# Patient Record
Sex: FEMALE | Race: BLACK OR AFRICAN AMERICAN | NOT HISPANIC OR LATINO | URBAN - METROPOLITAN AREA
[De-identification: names, ages, dates, MRNs, and addresses within clinical notes are randomized per-mention and may not be internally consistent; named-entity substitution may affect disease eponyms.]

---
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
## 2018-05-08 ENCOUNTER — OFFICE VISIT (OUTPATIENT)
Dept: FAMILY MEDICINE | Facility: CLINIC | Age: 18
End: 2018-05-08
Payer: COMMERCIAL

## 2018-05-08 VITALS
RESPIRATION RATE: 16 BRPM | TEMPERATURE: 97.9 F | HEIGHT: 63 IN | DIASTOLIC BLOOD PRESSURE: 75 MMHG | HEART RATE: 82 BPM | SYSTOLIC BLOOD PRESSURE: 118 MMHG | WEIGHT: 96.2 LBS | BODY MASS INDEX: 17.05 KG/M2 | OXYGEN SATURATION: 95 %

## 2018-05-08 DIAGNOSIS — L70.0 ACNE VULGARIS: Primary | ICD-10-CM

## 2018-05-08 DIAGNOSIS — H00.012 HORDEOLUM EXTERNUM OF RIGHT LOWER EYELID: ICD-10-CM

## 2018-05-08 RX ORDER — PIMECROLIMUS 10 MG/G
CREAM TOPICAL 2 TIMES DAILY
Qty: 60 G | Refills: 1 | Status: SHIPPED | OUTPATIENT
Start: 2018-05-08 | End: 2020-09-28

## 2018-05-08 NOTE — MR AVS SNAPSHOT
After Visit Summary   5/8/2018    Venice Carrero    MRN: [id number]           Patient Information     Date Of Birth          2000        Visit Information        Provider Department      5/8/2018 8:40 AM Chiara Pena MD [facility]        Today's Diagnoses     Acne vulgaris    -  1    Hordeolum externum of right lower eyelid          Care Instructions    Here is the plan from today's visit    Acne vulgaris  - refill pimecrolimus (ELIDEL) 1 % cream; Apply topically 2 times daily  Dispense: 60 g; Refill: 1    Hordeolum externum of right lower eyelid    Sty (or Stye)  A sty is an infection of the oil gland of the eyelid. It may develop into a small pocket of pus (an abscess). This can cause pain, redness, and swelling. In early stages, a sty is treated with antibiotic cream, eye drops, or a small towel soaked in warm water (a warm compress). More severe cases may need to be opened and drained by a healthcare provider.  Home care    Eye drops or ointment are usually prescribed to treat the infection. Use these as directed.     Artificial tears may also be used to lubricate the eye and make it more comfortable. You can buy these over the counter without a prescription. Talk with your healthcare provider before using any over-the-counter treatment for a sty.    Apply a warm, damp towel to the affected eye for at least 5 minutes, 3 to 4 times a day for a week. Warm compresses open the pores and speed the healing. But if the compresses are too hot, they may burn your eyelid.    Sometimes the sty will drain with this treatment alone. If this happens, keep using the antibiotic until all the redness and swelling are gone.    Wash your hands before and after touching the infected eyelid to avoid spreading the infection.    Don t squeeze or try to break open the sty.  Follow-up care  Follow up with your healthcare provider, or as advised.   When to seek medical advice  Call your healthcare  provider right away if any of these occur:    Increase in swelling or redness around the eyelid after 48 to 72 hours    Increase in eye pain or the eyelid blisters    Increase in warmth--the eyelid feels hot    Drainage of blood or thick pus from the sty    Blister on the eyelid    Inability to open the eyelid due to swelling    Fever of 100.4 F (38 C) or above, or as directed by your provider    Vision changes    Headache or stiff neck    The sty comes back  Date Last Reviewed: 8/1/2017 2000-2017 [employer]. [address] [place] [address]. All rights reserved. This information is not intended as a substitute for professional medical care. Always follow your healthcare professional's instructions.      Please call or return to clinic if your symptoms don't go away.    Follow up plan  As needed    Thank you for coming to [facility] today.  Lab Testing:  **If you had lab testing today and your results are reassuring or normal they will be mailed to you or sent through [employer] within 7 days.   **If the lab tests need quick action we will call you with the results.  The phone number we will call with results is # [phone] (home) . If this is not the best number please call our clinic and change the number.  Medication Refills:  If you need any refills please call your pharmacy and they will contact us.   If you need to  your refill at a new pharmacy, please contact the new pharmacy directly. The new pharmacy will help you get your medications transferred faster.   Scheduling:  If you have any concerns about today's visit or wish to schedule another appointment please call our office during normal business hours [phone] (8-5:00 M-F)  If a referral was made to a [facility] and you don't get a call from central scheduling please call [phone].  If a Mammogram was ordered for you at [facility] call [phone] to schedule or change your  "appointment.  If you had an XRay/CT/Ultrasound/MRI ordered the number is [phone] to schedule or change your radiology appointment.   Medical Concerns:  If you have urgent medical concerns please call [phone] at any time of the day.    Chiara Pena MD          Follow-ups after your visit        Who to contact     Please call your clinic at [phone] to:    Ask questions about your health    Make or cancel appointments    Discuss your medicines    Learn about your test results    Speak to your doctor            Additional Information About Your Visit        [employer] Information     [employer] is an electronic gateway that provides easy, online access to your medical records. With [employer], you can request a clinic appointment, read your test results, renew a prescription or communicate with your care team.     To sign up for [employer] visit the website at [email or web address]   You will be asked to enter the access code listed below, as well as some personal information. Please follow the directions to create your username and password.     Your access code is: [email or web address]  Expires: 2018  8:58 AM     Your access code will  in 90 days. If you need help or a new code, please contact your [facility] or call [phone] for assistance.        Care EveryWhere ID     This is your Care EveryWhere ID. This could be used by other organizations to access your [place] medical records  [id number]        Your Vitals Were     Pulse Temperature Respirations Height Pulse Oximetry Breastfeeding?    82 97.9  F (36.6  C) (Oral) 16 5' 2.5\" (158.8 cm) 95% No    BMI (Body Mass Index)                   17.31 kg/m2            Blood Pressure from Last 3 Encounters:   18 118/75   17 111/71   10/11/17 116/73    Weight from Last 3 Encounters:   18 96 lb 3.2 oz (43.6 kg) (2 %)*   17 106 lb (48.1 kg) (10 %)*   10/11/17 101 lb 6.4 oz (46 kg) (5 %)*     * Growth " percentiles are based on [facility] 2-20 Years data.              Today, you had the following     No orders found for display         Today's Medication Changes          These changes are accurate as of 5/8/18  8:58 AM.  If you have any questions, ask your nurse or doctor.               Start taking these medicines.        Dose/Directions    pimecrolimus 1 % cream   Commonly known as:  ELIDEL   Used for:  Acne vulgaris   Started by:  Chiara Pena MD        Apply topically 2 times daily   Quantity:  60 g   Refills:  1            Where to get your medicines      Call your pharmacy to confirm that your medication is ready for pickup. It may take up to 24 hours for them to receive the prescription. If the prescription is not ready within 3 business days, please contact your clinic or your provider.     We will let you know when these medications are ready. If you don't hear back within 3 business days, please contact us.     pimecrolimus 1 % cream                Primary Care Provider Office Phone # Fax #    Chiara Pena [phone]       [address] [address]        Equal Access to Services     [facility]: Hadii charisma ku hadasho Soomaali, waaxda luqadaha, qaybta kaalmada adeegyada, waxay wes young . So [facility] [phone].    ATENCIÓN: Si habla español, tiene a goodwin disposición servicios gratuitos de asistencia lingüística. Ll[facility] [phone].    We comply with applicable federal civil rights laws and [place] laws. We do not discriminate on the basis of race, color, national origin, age, disability, sex, sexual orientation, or gender identity.            Thank you!     Thank you for choosing [facility]  for your care. Our goal is always to provide you with excellent care. Hearing back from our patients is one way we can continue to improve our services. Please take a few minutes to complete the written survey that you may receive  in the mail after your visit with us. Thank you!             Your Updated Medication List - Protect others around you: Learn how to safely use, store and throw away your medicines at www.disposemymeds.org.          This list is accurate as of 5/8/18  8:58 AM.  Always use your most recent med list.                   Brand Name Dispense Instructions for use Diagnosis    cetirizine 10 MG tablet    zyrTEC    30 tablet    Take 1 tablet (10 mg) by mouth every evening        ibuprofen 600 MG tablet    ADVIL/MOTRIN    15 tablet    Take 1 tablet (600 mg) by mouth every 8 hours as needed for moderate pain        pimecrolimus 1 % cream    ELIDEL    60 g    Apply topically 2 times daily    Acne vulgaris

## 2018-05-08 NOTE — PROGRESS NOTES
HPI:       Venice Carrero is an otherwise healthy 17 year old who presents for a bump on her R eye. Pt notes that this painless bump located on her outer R lower lid has been present for 2 years - it has never resolved completely, although she notes it is slightly improved today. Has not tried anything for this in the past. Has also had a similar bump on her L eye a year ago that resolved spontaneously. Denies any pain, itchiness, conjunctival injection, eye discharge, changes in vision, fever or recent illness. Does not wear contacts and denies any recent eye trauma/foreign bodies.    She also expresses some concern about feeling underweight. Says she gets full easily, and therefore does not eat much. Denies any abdominal pain, fatigue/lethargy, n/v/d.     Eye(s) Problem    Concerns: Bump on R lower lid     When did it start? 2 years    Description:   Which eye(s): right  Pain:no  Redness: no    Accompanying Signs & Symptoms:  Discharge/mattering: no  Swelling: no  Visual changes: no  Fever:no  Nasal Congestion: no  Bothered by bright lights: no    History:   Trauma:no}  Foreign body exposure:no    Precipitating factors:   Wearing contacts: no    Alleviating factors:  Improved by: Nothing    Therapies Tried and outcome: Nothing    Problem, Medication and Allergy Lists were reviewed and are current.     Patient Active Problem List    Diagnosis Date Noted     Rash and nonspecific skin eruption 11/19/2017     Priority: Medium     Seasonal allergic rhinitis, unspecified allergic rhinitis trigger 05/06/2017     Priority: Medium     Finger injury, left, initial encounter 05/06/2017     Priority: Medium     Patient is History reviewed. No pertinent past medical history.         Review of Systems:   Review of Systems   A comprehensive 10 point review of systems was completed - pertinent positives and negatives listed above.        Physical Exam:   Patient Vitals for the past 24 hrs:   BP Temp Temp src Pulse Resp SpO2  "Height Weight   05/08/18 0841 118/75 97.9  F (36.6  C) Oral 82 16 95 % 5' 2.5\" (158.8 cm) 96 lb 3.2 oz (43.6 kg)     Body mass index is 17.31 kg/(m^2).  Vitals were reviewed and were normal     Physical Exam   Constitutional: She appears well-developed. No distress.   HENT:   Head: Normocephalic and atraumatic.   Nose: Nose normal.   Mouth/Throat: Oropharynx is clear and moist. No oropharyngeal exudate.   Eyes: Conjunctivae and EOM are normal. Pupils are equal, round, and reactive to light. Right eye exhibits no discharge. Left eye exhibits no discharge. No scleral icterus.   Non-tender hordeolum of outer R lower lid   Neck: Neck supple. No JVD present. No thyromegaly present.   Cardiovascular: Normal rate, regular rhythm and normal heart sounds.  Exam reveals no gallop and no friction rub.    No murmur heard.  Pulmonary/Chest: Effort normal and breath sounds normal. No respiratory distress. She has no wheezes.   Abdominal: Soft. Bowel sounds are normal. She exhibits no distension. There is no tenderness.   Musculoskeletal: She exhibits no edema, tenderness or deformity.   Lymphadenopathy:     She has no cervical adenopathy.   Skin: Skin is warm and dry. No rash noted. No erythema.   Vitals reviewed.      Results:     Results from last visit:  Office Visit on 08/25/2016   Component Date Value Ref Range Status     Rapid Strep A Screen 08/25/2016 NEGATIVE  Negative Final     Specimen Description 08/25/2016 Throat   Final     Culture Micro 08/25/2016 No Beta Streptococcus isolated   Final     Micro Report Status 08/25/2016 FINAL 08/27/2016   Final     Assessment and Plan     18 yo woman  With    #Hordeolum externum of right lower eyelid  - warm compress and allow stye to self-resolve  - discussed ophthalmology referral if worsens or pt develops signs of infection    #Acne vulgaris  - refill pimecrolimus (ELIDEL) 1 % cream; Apply topically 2 times daily  Dispense: 60 g; Refill: 1    Invited to RTC to address her low " weight concerns.    There are no discontinued medications.  Options for treatment and follow-up care were reviewed with the patient. Venice Carrero  engaged in the decision making process and verbalized understanding of the options discussed and agreed with the final plan.    Chiara Pena MD

## 2018-05-08 NOTE — PATIENT INSTRUCTIONS
Here is the plan from today's visit    Acne vulgaris  - refill pimecrolimus (ELIDEL) 1 % cream; Apply topically 2 times daily  Dispense: 60 g; Refill: 1    Hordeolum externum of right lower eyelid    Sty (or Stye)  A sty is an infection of the oil gland of the eyelid. It may develop into a small pocket of pus (an abscess). This can cause pain, redness, and swelling. In early stages, a sty is treated with antibiotic cream, eye drops, or a small towel soaked in warm water (a warm compress). More severe cases may need to be opened and drained by a healthcare provider.  Home care    Eye drops or ointment are usually prescribed to treat the infection. Use these as directed.     Artificial tears may also be used to lubricate the eye and make it more comfortable. You can buy these over the counter without a prescription. Talk with your healthcare provider before using any over-the-counter treatment for a sty.    Apply a warm, damp towel to the affected eye for at least 5 minutes, 3 to 4 times a day for a week. Warm compresses open the pores and speed the healing. But if the compresses are too hot, they may burn your eyelid.    Sometimes the sty will drain with this treatment alone. If this happens, keep using the antibiotic until all the redness and swelling are gone.    Wash your hands before and after touching the infected eyelid to avoid spreading the infection.    Don t squeeze or try to break open the sty.  Follow-up care  Follow up with your healthcare provider, or as advised.   When to seek medical advice  Call your healthcare provider right away if any of these occur:    Increase in swelling or redness around the eyelid after 48 to 72 hours    Increase in eye pain or the eyelid blisters    Increase in warmth--the eyelid feels hot    Drainage of blood or thick pus from the sty    Blister on the eyelid    Inability to open the eyelid due to swelling    Fever of 100.4 F (38 C) or above, or as directed by your  provider    Vision changes    Headache or stiff neck    The sty comes back  Date Last Reviewed: 8/1/2017 2000-2017 [employer]. [address] [place] [address], [place] [address]. All rights reserved. This information is not intended as a substitute for professional medical care. Always follow your healthcare professional's instructions.      Please call or return to clinic if your symptoms don't go away.    Follow up plan  As needed    Thank you for coming to [facility] today.  Lab Testing:  **If you had lab testing today and your results are reassuring or normal they will be mailed to you or sent through [employer] within 7 days.   **If the lab tests need quick action we will call you with the results.  The phone number we will call with results is # [phone] (home) . If this is not the best number please call our clinic and change the number.  Medication Refills:  If you need any refills please call your pharmacy and they will contact us.   If you need to  your refill at a new pharmacy, please contact the new pharmacy directly. The new pharmacy will help you get your medications transferred faster.   Scheduling:  If you have any concerns about today's visit or wish to schedule another appointment please call our office during normal business hours [phone] (8-5:00 M-F)  If a referral was made to a [facility] and you don't get a call from central scheduling please call [phone].  If a Mammogram was ordered for you at [facility] call [phone] to schedule or change your appointment.  If you had an XRay/CT/Ultrasound/MRI ordered the number is [phone] to schedule or change your radiology appointment.   Medical Concerns:  If you have urgent medical concerns please call [phone] at any time of the day.    Chiara Pena MD

## 2018-05-08 NOTE — PROGRESS NOTES
I was present with the medical student who participated in the service and in the documentation of this note. I have verified the history and personally performed the physical exam and medical decision making, and have verified the content of the note, which accurately reflects my assessment of the patient and the plan of care.   Chiara Pena MD

## 2020-09-28 ENCOUNTER — OFFICE VISIT (OUTPATIENT)
Dept: FAMILY MEDICINE | Facility: CLINIC | Age: 20
End: 2020-09-28
Payer: COMMERCIAL

## 2020-09-28 VITALS
HEART RATE: 95 BPM | WEIGHT: 116.8 LBS | RESPIRATION RATE: 16 BRPM | HEIGHT: 63 IN | OXYGEN SATURATION: 98 % | BODY MASS INDEX: 20.7 KG/M2 | SYSTOLIC BLOOD PRESSURE: 116 MMHG | DIASTOLIC BLOOD PRESSURE: 79 MMHG | TEMPERATURE: 98.5 F

## 2020-09-28 DIAGNOSIS — L81.0 POST-INFLAMMATORY HYPERPIGMENTATION: Primary | ICD-10-CM

## 2020-09-28 ASSESSMENT — MIFFLIN-ST. JEOR: SCORE: 1263.93

## 2020-09-28 NOTE — PATIENT INSTRUCTIONS
Here is the plan from today's visit    1. Post-inflammatory hyperpigmentation  These skin changes are related to inflammation from using a portable heater, daily. These skin changes may go away in months or may be permanent. If concerned about the cosmetic appearance, we can place a dermatology referral in the future for laser therapy.    Stop using the heater. Wear long pants, socks, or use a blanket for heat instead.      Please call or return to clinic if your symptoms don't go away.    Follow up as needed.    Thank you for coming to [facility] today.  Lab Testing:  **If you had lab testing today and your results are reassuring or normal they will be mailed to you or sent through [employer] within 7 days.   **If the lab tests need quick action we will call you with the results.  The phone number we will call with results is # [phone] (home) . If this is not the best number please call our clinic and change the number.  Medication Refills:  If you need any refills please call your pharmacy and they will contact us.   If you need to  your refill at a new pharmacy, please contact the new pharmacy directly. The new pharmacy will help you get your medications transferred faster.   Scheduling:  If you have any concerns about today's visit or wish to schedule another appointment please call our office during normal business hours [phone] (8-5:00 M-F)  If a referral was made to a [facility] and you don't get a call from central scheduling please call [phone].  If a Mammogram was ordered for you at [facility] call [phone] to schedule or change your appointment.  If you had an XRay/CT/Ultrasound/MRI ordered the number is [phone] to schedule or change your radiology appointment.   Medical Concerns:  If you have urgent medical concerns please call [phone] at any time of the day.    Steven Harper, DO

## 2020-09-28 NOTE — PROGRESS NOTES
Venice is a 19 year old  who presents for   Patient presents with:  Derm Problem: back of both calves she has dark lines going up. First notices 7 months ago. No pain or itching.    Assessment and Plan        1. Post-inflammatory hyperpigmentation  Pt has a mottled/reticular, flat, diffuse hyperpigmented lesion over bilateral calves, which started March 2020 and has increased in distribution. Non-tender without swelling. Pt is otherwise well. No sun exposure or medication use. Healthy female-no history of liver disease. No raised lesions to suggest cellulitis, dermatitis, or fungal infection.     Suspect these skin changes are an inflammatory response to daily/chronic use of a portable heater. Advised to stop using the heater and use indirect forms of heat instead (ex. socks, pants, blanket). These skin changes may resolve in months, or may be permanent. If pt desires, can place a dermatology referral for laser consultation in the future.       No follow-ups on file.    Medications Discontinued During This Encounter   Medication Reason     pimecrolimus (ELIDEL) 1 % cream Medication Reconciliation Clean Up     cetirizine (ZYRTEC) 10 MG tablet Medication Reconciliation Clean Up     ibuprofen (ADVIL,MOTRIN) 600 MG tablet Medication Reconciliation Clean Up         Steven Harper, DO         HPI       Venice is a 19 year old  who presents for   Patient presents with:  Derm Problem: back of both calves she has dark lines going up. First notices 7 months ago. No pain or itching.      Visit [place]  1. Marks on skin  -no family members with similar lesions  -not bothersome, just curious what it is    -when:    -march 2020  -description:    -hyperpigmented lesions, flat   -non-tender, non-pruritic   -no open wounds   -no swelling  -location:   -bilateral calves   -no other body areas affected  -exposures:   -denies sun exposure   -no medications or creams   -has been using a heater daily (always cold)  -therapies  "tried:   -none    Problem, Medication and Allergy Lists were reviewed and updated if needed..  Patient is an established patient of this clinic..  Health Maintenance Due   Topic Date Due     HIV SCREENING  11/15/2013     PREVENTIVE CARE VISIT  06/12/2015     PAP  11/15/2019     PHQ-2  01/01/2020     INFLUENZA VACCINE (1) 09/01/2020          Review of Systems:   12 point review of symptoms was otherwise negative except as noted in HPI         Physical Exam:     Vitals:    09/28/20 1440   BP: 116/79   Pulse: 95   Resp: 16   Temp: 98.5  F (36.9  C)   TempSrc: Oral   SpO2: 98%   Weight: 53 kg (116 lb 12.8 oz)   Height: 1.6 m (5' 3\")     Body mass index is 20.69 kg/m .  Vitals were reviewed and were normal     Physical Exam  Constitutional:       General: She is not in acute distress.     Appearance: Normal appearance. She is normal weight. She is not ill-appearing or diaphoretic.   HENT:      Head: Normocephalic and atraumatic.      Mouth/Throat:      Mouth: Mucous membranes are moist.   Eyes:      General: No scleral icterus.     Extraocular Movements: Extraocular movements intact.      Conjunctiva/sclera: Conjunctivae normal.      Pupils: Pupils are equal, round, and reactive to light.   Pulmonary:      Effort: Pulmonary effort is normal. No respiratory distress.   Abdominal:      General: Abdomen is flat. There is no distension.   Musculoskeletal: Normal range of motion.      Right lower leg: No edema.      Left lower leg: No edema.      Comments: No calf tenderness.   Skin:     Capillary Refill: Capillary refill takes less than 2 seconds.      Coloration: Skin is not jaundiced.      Comments: See image below. Remainder of exposed skin normal w/o any skin changes.   Neurological:      General: No focal deficit present.      Mental Status: She is alert and oriented to person, place, and time. Mental status is at baseline.      Cranial Nerves: No cranial nerve deficit.      Motor: No weakness.      Coordination: " Coordination normal.      Gait: Gait normal.   Psychiatric:         Mood and Affect: Mood normal.         Thought Content: Thought content normal.           Image: L lower leg      Results:   No testing ordered today    Options for treatment and follow-up care were reviewed with the patient. Venice Carrero  engaged in the decision making process and verbalized understanding of the options discussed and agreed with the final plan.    Steven Harper, DO

## 2020-09-28 NOTE — PROGRESS NOTES
Preceptor Attestation:  Patient seen and evaluated in person. I discussed the patient with the resident. I have verified the content of the note, which accurately reflects my assessment of the patient and the plan of care.   Supervising Physician:  Myles Zuniga DO.

## 2021-05-24 ENCOUNTER — OFFICE VISIT (OUTPATIENT)
Dept: FAMILY MEDICINE | Facility: CLINIC | Age: 21
End: 2021-05-24
Payer: COMMERCIAL

## 2021-05-24 VITALS
HEIGHT: 63 IN | HEART RATE: 90 BPM | RESPIRATION RATE: 14 BRPM | TEMPERATURE: 97.8 F | WEIGHT: 133 LBS | SYSTOLIC BLOOD PRESSURE: 107 MMHG | BODY MASS INDEX: 23.57 KG/M2 | DIASTOLIC BLOOD PRESSURE: 74 MMHG | OXYGEN SATURATION: 98 %

## 2021-05-24 DIAGNOSIS — H61.23 BILATERAL IMPACTED CERUMEN: Primary | ICD-10-CM

## 2021-05-24 PROCEDURE — 69210 REMOVE IMPACTED EAR WAX UNI: CPT | Mod: GC | Performed by: STUDENT IN AN ORGANIZED HEALTH CARE EDUCATION/TRAINING PROGRAM

## 2021-05-24 ASSESSMENT — MIFFLIN-ST. JEOR: SCORE: 1332.28

## 2021-05-24 NOTE — PROGRESS NOTES
"    Assessment & Plan     Bilateral impacted cerumen  Pre-procedure Diagnosis: Cerumen impaction, bilateral  Post-procedure Diagnosis: same    Technique:   Water irrigation with peroxide, and curette. Both ears.     Patient tolerated procedure well. Significant efflux cerumen seen. TMs visualized s/p wash. Hearing normalized.    For maintenance, recommended wiping outer ear with washcloth. Already avoiding Q tips.    Resident: Puja Leyva MD  Faculty: Puja Leyva MD present for and supervised this entire procedure.         No follow-ups on file.    Puja Leyva MD  [facility] BRANDON Millard is a 20 year old who presents for the following health issues     HPI     Patient presents with:  Ear Problem: patient has had trouble hearing in left ear that started wednesday. pt does not have itching or pain in the ear but only feels like she cant hear. pt tried ear wax removal kit and it did not help, she still feels like she cant hear from her ear.      Since Wednesday - left ear very muffled. Tried OTC removal kit - drops and bulb, on Sat, Sun. Didn't improve.  No fever  No drainage  Doesn't use Q tips    Fever Sun - subjective. Just got 2nd covid shot.    Review of Systems         Objective    /74   Pulse 90   Temp 97.8  F (36.6  C) (Oral)   Resp 14   Ht 1.6 m (5' 2.99\")   Wt 60.3 kg (133 lb)   SpO2 98%   BMI 23.57 kg/m    Body mass index is 23.57 kg/m .  Physical Exam  Constitutional:       General: She is not in acute distress.     Appearance: She is well-developed. She is not diaphoretic.   HENT:      Head: Normocephalic and atraumatic.      Right Ear: There is impacted cerumen.      Left Ear: There is impacted cerumen.   Eyes:      Conjunctiva/sclera: Conjunctivae normal.   Neck:      Musculoskeletal: Normal range of motion.   Cardiovascular:      Rate and Rhythm: Normal rate.   Pulmonary:      Effort: Pulmonary effort is normal. No respiratory distress. "   Musculoskeletal: Normal range of motion.   Neurological:      General: No focal deficit present.      Mental Status: She is alert.            ----- Service Performed and Documented by Resident or Fellow ------

## 2021-05-24 NOTE — PROGRESS NOTES
Preceptor Attestation:    I discussed the patient with the resident and evaluated the patient in person. I was present for and supervised the entire procedure. I have verified the content of the note, which accurately reflects my assessment of the patient and the plan of care.   Supervising Physician:  Myles Zuniga DO.

## 2025-01-16 PROBLEM — L81.0 POST-INFLAMMATORY HYPERPIGMENTATION: Status: RESOLVED | Noted: 2020-09-28 | Resolved: 2025-01-16

## 2025-01-16 PROBLEM — S69.92XA FINGER INJURY, LEFT, INITIAL ENCOUNTER: Status: RESOLVED | Noted: 2017-05-06 | Resolved: 2025-01-16

## 2025-02-06 ENCOUNTER — OFFICE VISIT (OUTPATIENT)
Dept: FAMILY MEDICINE | Facility: CLINIC | Age: 25
End: 2025-02-06
Payer: COMMERCIAL

## 2025-02-06 VITALS
OXYGEN SATURATION: 98 % | BODY MASS INDEX: 27.44 KG/M2 | RESPIRATION RATE: 15 BRPM | SYSTOLIC BLOOD PRESSURE: 116 MMHG | HEART RATE: 89 BPM | DIASTOLIC BLOOD PRESSURE: 81 MMHG | HEIGHT: 62 IN | TEMPERATURE: 97.3 F

## 2025-02-06 DIAGNOSIS — H61.23 BILATERAL IMPACTED CERUMEN: Primary | ICD-10-CM

## 2025-02-06 ASSESSMENT — PAIN SCALES - GENERAL: PAINLEVEL_OUTOF10: NO PAIN (0)

## 2025-02-06 NOTE — PROGRESS NOTES
"  Assessment & Plan     Bilateral impacted cerumen  - resolved with ear wash and manual disimpaction.     Declined vaccines  Follow-up as needed.         Subjective   Venice is a 24 year old, presenting for the following health issues:  Ear Problem (Ear Concerns)      2/6/2025     3:29 PM   Additional Questions   Roomed by Matt   Accompanied by Self         2/6/2025    Information    services provided? No     HPI     Venice presents with B cerumen impaction. Noted at recent CPE. Has had muffled hearing and sensation of pressure.          Objective    /81 (BP Location: Left arm, Patient Position: Sitting, Cuff Size: Adult Regular)   Pulse 89   Temp 97.3  F (36.3  C) (Temporal)   Resp 15   Ht 1.575 m (5' 2\")   LMP 01/27/2025 (Within Weeks)   SpO2 98%   BMI 27.44 kg/m    Body mass index is 27.44 kg/m .  Physical Exam     GEN: healthy, NAD  HEENT: B cerumen impaction    Signed Electronically by: Chiara Pena MD    "